# Patient Record
Sex: FEMALE | Race: BLACK OR AFRICAN AMERICAN | NOT HISPANIC OR LATINO | URBAN - METROPOLITAN AREA
[De-identification: names, ages, dates, MRNs, and addresses within clinical notes are randomized per-mention and may not be internally consistent; named-entity substitution may affect disease eponyms.]

---

## 2017-06-05 ENCOUNTER — EMERGENCY (EMERGENCY)
Facility: HOSPITAL | Age: 22
LOS: 1 days | Discharge: HOME | End: 2017-06-05
Attending: EMERGENCY MEDICINE | Admitting: EMERGENCY MEDICINE
Payer: COMMERCIAL

## 2017-06-05 VITALS
HEART RATE: 80 BPM | RESPIRATION RATE: 16 BRPM | DIASTOLIC BLOOD PRESSURE: 87 MMHG | SYSTOLIC BLOOD PRESSURE: 123 MMHG | TEMPERATURE: 98 F | OXYGEN SATURATION: 100 %

## 2017-06-05 DIAGNOSIS — R30.0 DYSURIA: ICD-10-CM

## 2017-06-05 DIAGNOSIS — N39.0 URINARY TRACT INFECTION, SITE NOT SPECIFIED: ICD-10-CM

## 2017-06-05 LAB
APPEARANCE UR: SIGNIFICANT CHANGE UP
BACTERIA # UR AUTO: PRESENT /HPF
BILIRUB UR-MCNC: NEGATIVE — SIGNIFICANT CHANGE UP
COLOR SPEC: YELLOW — SIGNIFICANT CHANGE UP
DIFF PNL FLD: (no result)
EPI CELLS # UR: SIGNIFICANT CHANGE UP /HPF
GLUCOSE UR QL: NEGATIVE — SIGNIFICANT CHANGE UP
KETONES UR-MCNC: NEGATIVE — SIGNIFICANT CHANGE UP
LEUKOCYTE ESTERASE UR-ACNC: (no result)
NITRITE UR-MCNC: NEGATIVE — SIGNIFICANT CHANGE UP
PH UR: 6.5 — SIGNIFICANT CHANGE UP (ref 5–8)
PROT UR-MCNC: NEGATIVE MG/DL — SIGNIFICANT CHANGE UP
RBC CASTS # UR COMP ASSIST: (no result) /HPF
SP GR SPEC: 1.01 — SIGNIFICANT CHANGE UP (ref 1–1.03)
UROBILINOGEN FLD QL: 0.2 E.U./DL — SIGNIFICANT CHANGE UP
WBC UR QL: (no result) /HPF

## 2017-06-05 PROCEDURE — 87186 SC STD MICRODIL/AGAR DIL: CPT

## 2017-06-05 PROCEDURE — 81001 URINALYSIS AUTO W/SCOPE: CPT

## 2017-06-05 PROCEDURE — 87086 URINE CULTURE/COLONY COUNT: CPT

## 2017-06-05 PROCEDURE — 99283 EMERGENCY DEPT VISIT LOW MDM: CPT

## 2017-06-05 RX ORDER — PHENAZOPYRIDINE HCL 100 MG
1 TABLET ORAL
Qty: 6 | Refills: 0
Start: 2017-06-05 | End: 2017-06-07

## 2017-06-05 RX ORDER — CEFPODOXIME PROXETIL 100 MG
1 TABLET ORAL
Qty: 14 | Refills: 0
Start: 2017-06-05 | End: 2017-06-12

## 2017-06-05 NOTE — ED ADULT TRIAGE NOTE - CHIEF COMPLAINT QUOTE
increased urine frequency today , associated "tingling" ; denies vaginal discharge or other symptoms

## 2017-06-05 NOTE — ED PROVIDER NOTE - OBJECTIVE STATEMENT
The pt is a 20 y/o F, who presents to ED c/o dysuria and frequency x 1 d. Denies fevers, chills, n/v, flank pain, vag d/c

## 2017-06-05 NOTE — ED PROVIDER NOTE - MEDICAL DECISION MAKING DETAILS
pt w/uncomplicated uti, no signs of pyelo, icon neg, will tx w/abx , prn pyridium, understands to hydrate and f/u w/gyn or gu, to return for any worsening symptoms

## 2017-06-07 LAB
-  AMPICILLIN/SULBACTAM: SIGNIFICANT CHANGE UP
-  AMPICILLIN: SIGNIFICANT CHANGE UP
-  CEFAZOLIN: SIGNIFICANT CHANGE UP
-  CEFTRIAXONE: SIGNIFICANT CHANGE UP
-  CIPROFLOXACIN: SIGNIFICANT CHANGE UP
-  GENTAMICIN: SIGNIFICANT CHANGE UP
-  NITROFURANTOIN: SIGNIFICANT CHANGE UP
-  PIPERACILLIN/TAZOBACTAM: SIGNIFICANT CHANGE UP
-  TOBRAMYCIN: SIGNIFICANT CHANGE UP
-  TRIMETHOPRIM/SULFAMETHOXAZOLE: SIGNIFICANT CHANGE UP
CULTURE RESULTS: SIGNIFICANT CHANGE UP
METHOD TYPE: SIGNIFICANT CHANGE UP
ORGANISM # SPEC MICROSCOPIC CNT: SIGNIFICANT CHANGE UP
ORGANISM # SPEC MICROSCOPIC CNT: SIGNIFICANT CHANGE UP
SPECIMEN SOURCE: SIGNIFICANT CHANGE UP

## 2017-06-19 ENCOUNTER — APPOINTMENT (OUTPATIENT)
Dept: OBGYN | Facility: CLINIC | Age: 22
End: 2017-06-19

## 2017-06-19 VITALS
HEIGHT: 64 IN | BODY MASS INDEX: 23.05 KG/M2 | SYSTOLIC BLOOD PRESSURE: 100 MMHG | DIASTOLIC BLOOD PRESSURE: 70 MMHG | WEIGHT: 135 LBS

## 2017-06-19 DIAGNOSIS — N91.3 PRIMARY OLIGOMENORRHEA: ICD-10-CM

## 2017-06-19 DIAGNOSIS — Z11.3 ENCOUNTER FOR SCREENING FOR INFECTIONS WITH A PREDOMINANTLY SEXUAL MODE OF TRANSMISSION: ICD-10-CM

## 2017-06-19 DIAGNOSIS — Z30.09 ENCOUNTER FOR OTHER GENERAL COUNSELING AND ADVICE ON CONTRACEPTION: ICD-10-CM

## 2017-06-19 DIAGNOSIS — Z01.419 ENCOUNTER FOR GYNECOLOGICAL EXAMINATION (GENERAL) (ROUTINE) W/OUT ABNORMAL FINDINGS: ICD-10-CM

## 2017-06-19 DIAGNOSIS — N39.0 URINARY TRACT INFECTION, SITE NOT SPECIFIED: ICD-10-CM

## 2017-06-19 DIAGNOSIS — B37.3 CANDIDIASIS OF VULVA AND VAGINA: ICD-10-CM

## 2017-06-19 DIAGNOSIS — Z87.898 PERSONAL HISTORY OF OTHER SPECIFIED CONDITIONS: ICD-10-CM

## 2017-06-19 RX ORDER — CEFPODOXIME PROXETIL 200 MG/1
200 TABLET, FILM COATED ORAL
Qty: 14 | Refills: 0 | Status: COMPLETED | COMMUNITY
Start: 2017-06-05

## 2017-06-19 RX ORDER — FLUCONAZOLE 150 MG/1
150 TABLET ORAL
Qty: 1 | Refills: 0 | Status: COMPLETED | COMMUNITY
Start: 2017-06-08

## 2017-06-19 RX ORDER — LEVONORGESTREL 1.5 MG/1
1.5 TABLET ORAL
Qty: 1 | Refills: 2 | Status: ACTIVE | COMMUNITY
Start: 2017-06-19 | End: 1900-01-01

## 2017-06-19 RX ORDER — IBUPROFEN 600 MG/1
600 TABLET, FILM COATED ORAL
Qty: 28 | Refills: 0 | Status: COMPLETED | COMMUNITY
Start: 2017-02-25

## 2017-06-19 RX ORDER — PHENAZOPYRIDINE HYDROCHLORIDE 200 MG/1
200 TABLET ORAL
Qty: 6 | Refills: 0 | Status: COMPLETED | COMMUNITY
Start: 2017-06-05

## 2017-06-19 RX ORDER — LEVONORGESTREL AND ETHINYL ESTRADIOL 100-20(84)
0.1-0.02 & 0.01 KIT ORAL DAILY
Qty: 1 | Refills: 2 | Status: ACTIVE | COMMUNITY
Start: 2017-06-19 | End: 1900-01-01

## 2017-06-20 LAB
ESTRADIOL SERPL-MCNC: 44 PG/ML
FSH SERPL-MCNC: 6.9 IU/L
HCG SERPL-MCNC: <1 MIU/ML
HIV1+2 AB SPEC QL IA.RAPID: NONREACTIVE
PROLACTIN SERPL-MCNC: 24.2 NG/ML
T PALLIDUM AB SER QL IA: NEGATIVE
TSH SERPL-ACNC: 1.61 UIU/ML

## 2017-06-21 LAB
C TRACH RRNA SPEC QL NAA+PROBE: NORMAL
N GONORRHOEA RRNA SPEC QL NAA+PROBE: NORMAL
SOURCE TP AMPLIFICATION: NORMAL

## 2017-06-26 ENCOUNTER — APPOINTMENT (OUTPATIENT)
Dept: INTERNAL MEDICINE | Facility: CLINIC | Age: 22
End: 2017-06-26

## 2017-06-26 VITALS
HEART RATE: 74 BPM | OXYGEN SATURATION: 97 % | HEIGHT: 64 IN | BODY MASS INDEX: 24.75 KG/M2 | WEIGHT: 145 LBS | SYSTOLIC BLOOD PRESSURE: 100 MMHG | TEMPERATURE: 98.6 F | DIASTOLIC BLOOD PRESSURE: 70 MMHG

## 2017-06-26 DIAGNOSIS — H53.8 OTHER VISUAL DISTURBANCES: ICD-10-CM

## 2017-06-26 DIAGNOSIS — Z00.00 ENCOUNTER FOR GENERAL ADULT MEDICAL EXAMINATION W/OUT ABNORMAL FINDINGS: ICD-10-CM

## 2017-06-26 DIAGNOSIS — R51 HEADACHE: ICD-10-CM

## 2017-06-27 LAB
CYTOLOGY CVX/VAG DOC THIN PREP: NORMAL
DHEA-SULFATE, SERUM: 179 UG/DL
TESTOST BND SERPL-MCNC: 0.8 PG/ML
TESTOST SERPL-MCNC: 32.9 NG/DL

## 2017-07-03 ENCOUNTER — APPOINTMENT (OUTPATIENT)
Dept: OBGYN | Facility: CLINIC | Age: 22
End: 2017-07-03
Payer: SELF-PAY

## 2017-07-03 VITALS
WEIGHT: 145 LBS | HEIGHT: 64 IN | DIASTOLIC BLOOD PRESSURE: 70 MMHG | SYSTOLIC BLOOD PRESSURE: 110 MMHG | BODY MASS INDEX: 24.75 KG/M2

## 2017-07-03 DIAGNOSIS — E22.9 HYPERFUNCTION OF PITUITARY GLAND, UNSPECIFIED: ICD-10-CM

## 2017-07-03 PROCEDURE — 99213 OFFICE O/P EST LOW 20 MIN: CPT

## 2017-07-06 ENCOUNTER — LABORATORY RESULT (OUTPATIENT)
Age: 22
End: 2017-07-06

## 2017-07-11 ENCOUNTER — TRANSCRIPTION ENCOUNTER (OUTPATIENT)
Age: 22
End: 2017-07-11

## 2017-07-11 ENCOUNTER — RESULT REVIEW (OUTPATIENT)
Age: 22
End: 2017-07-11

## 2018-10-09 ENCOUNTER — EMERGENCY (EMERGENCY)
Facility: HOSPITAL | Age: 23
LOS: 1 days | Discharge: HOME | End: 2018-10-09
Admitting: EMERGENCY MEDICINE
Payer: COMMERCIAL

## 2018-10-09 VITALS
HEART RATE: 79 BPM | TEMPERATURE: 98 F | SYSTOLIC BLOOD PRESSURE: 108 MMHG | RESPIRATION RATE: 18 BRPM | DIASTOLIC BLOOD PRESSURE: 70 MMHG | HEIGHT: 64 IN | OXYGEN SATURATION: 98 % | WEIGHT: 143.52 LBS

## 2018-10-09 DIAGNOSIS — H93.8X2 OTHER SPECIFIED DISORDERS OF LEFT EAR: ICD-10-CM

## 2018-10-09 DIAGNOSIS — Z79.899 OTHER LONG TERM (CURRENT) DRUG THERAPY: ICD-10-CM

## 2018-10-09 DIAGNOSIS — Z79.1 LONG TERM (CURRENT) USE OF NON-STEROIDAL ANTI-INFLAMMATORIES (NSAID): ICD-10-CM

## 2018-10-09 PROCEDURE — 99283 EMERGENCY DEPT VISIT LOW MDM: CPT

## 2018-10-09 RX ORDER — IBUPROFEN 200 MG
1 TABLET ORAL
Qty: 10 | Refills: 0
Start: 2018-10-09 | End: 2018-10-13

## 2018-10-09 RX ORDER — FLUTICASONE PROPIONATE 50 MCG
2 SPRAY, SUSPENSION NASAL
Qty: 1 | Refills: 0
Start: 2018-10-09 | End: 2018-10-11
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
## 2019-07-17 ENCOUNTER — EMERGENCY (EMERGENCY)
Facility: HOSPITAL | Age: 24
LOS: 1 days | Discharge: HOME | End: 2019-07-17
Admitting: EMERGENCY MEDICINE
Payer: COMMERCIAL

## 2019-07-17 VITALS
TEMPERATURE: 98 F | HEIGHT: 64 IN | RESPIRATION RATE: 18 BRPM | OXYGEN SATURATION: 98 % | WEIGHT: 138.01 LBS | DIASTOLIC BLOOD PRESSURE: 74 MMHG | SYSTOLIC BLOOD PRESSURE: 104 MMHG | HEART RATE: 76 BPM
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
## 2019-07-17 DIAGNOSIS — S93.602A UNSPECIFIED SPRAIN OF LEFT FOOT, INITIAL ENCOUNTER: ICD-10-CM

## 2019-07-17 DIAGNOSIS — M25.572 PAIN IN LEFT ANKLE AND JOINTS OF LEFT FOOT: ICD-10-CM

## 2019-07-17 DIAGNOSIS — S93.402A SPRAIN OF UNSPECIFIED LIGAMENT OF LEFT ANKLE, INITIAL ENCOUNTER: ICD-10-CM
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
## 2019-07-17 PROCEDURE — 99283 EMERGENCY DEPT VISIT LOW MDM: CPT

## 2019-07-17 RX ORDER — IBUPROFEN 200 MG
600 TABLET ORAL ONCE
Refills: 0 | Status: COMPLETED | OUTPATIENT
Start: 2019-07-17 | End: 2019-07-17

## 2019-07-17 RX ADMIN — Medication 600 MILLIGRAM(S): at 13:48

## 2019-07-17 RX ADMIN — Medication 600 MILLIGRAM(S): at 13:46
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
## 2019-07-17 NOTE — ED PROVIDER NOTE - CLINICAL SUMMARY MEDICAL DECISION MAKING FREE TEXT BOX
22 yo female , well appearing, in NAD, ambulatory w/o difficulties, in the Er due to left ankle/foot [pain since morning. exam- mild tenderness to palpation to the dorsal aspect of left foot and ankle, no deformity, no edema, no vascular compromise. Pt suspect sprain. possibility of sprain discussed. will d/c home with ACE bandage and NSAIDs for pain. ortho f/u if pain persist or become worse.

## 2019-07-17 NOTE — ED PROVIDER NOTE - CARE PROVIDER_API CALL
Warren Conroy)  Orthopaedic Surgery  [address] [place] [address]  Phone: [phone]  Fax: [phone]  Follow Up Time:

## 2019-07-17 NOTE — ED PROVIDER NOTE - NSFOLLOWUPINSTRUCTIONS_ED_ALL_ED_FT
I have discussed the discharge plan with the patient. The patient agrees with the plan, as discussed.  The patient understands Emergency Department diagnosis is a preliminary diagnosis often based on limited information and that the patient must adhere to the follow-up plan as discussed.  The patient understands that if the symptoms worsen or if prescribed medications do not have the desired/planned effect that the patient may return to the Emergency Department at any time for further evaluation and treatment.      Ankle Sprain  Image   An ankle sprain is a stretch or tear in a ligament in the ankle. Ligaments are tissues that connect bones to each other.    The two most common types of ankle sprains are:  Inversion sprain. This happens when the foot turns inward and the ankle rolls outward. It affects the ligament on the outside of the foot (lateral ligament).  Eversion sprain. This happens when the foot turns outward and the ankle rolls inward. It affects the ligament on the inner side of the foot (medial ligament).  What are the causes?  This condition is often caused by accidentally rolling or twisting the ankle.    What increases the risk?  This condition is more likely to develop in people who play sports.    What are the signs or symptoms?  ImageSymptoms of this condition include:  Pain in your ankle.  Swelling.  Bruising. Bruising may develop right after you sprain your ankle or 1–2 days later.  Trouble standing or walking, especially when you turn or change directions.  How is this diagnosed?  This condition is diagnosed with a physical exam. During the exam, your health care provider will press on certain parts of your foot and ankle and try to move them in certain ways. X-rays may be taken to see how severe the sprain is and to check for broken bones.    How is this treated?  This condition may be treated with:  A brace. This is used to keep the ankle from moving until it heals.  An elastic bandage. This is used to support the ankle.  Crutches.  Pain medicine.  Surgery. This may be needed if the sprain is severe.  Physical therapy. This may help to improve the range of motion in the ankle.  Follow these instructions at home:  Image   Rest your ankle.  Take over-the-counter and prescription medicines only as told by your health care provider.  For 2–3 days, keep your ankle raised (elevated) above the level of your heart as much as possible.  If directed, apply ice to the area:  Put ice in a plastic bag.  Place a towel between your skin and the bag.  Leave the ice on for 20 minutes, 2–3 times a day.  If you were given a brace:  Wear it as directed.  Remove it to shower or bathe.  Try not to move your ankle much, but wiggle your toes from time to time. This helps to prevent swelling.  If you were given an elastic bandage (dressing):  Remove it to shower or bathe.  Try not to move your ankle much, but wiggle your toes from time to time. This helps to prevent swelling.  Adjust the dressing to make it more comfortable if it feels too tight.  Loosen the dressing if you have numbness or tingling in your foot, or if your foot becomes cold and blue.  If you have crutches, use them as told by your health care provider.  Contact a health care provider if:  You have rapidly increasing bruising or swelling.  Your pain is not relieved with medicine.  Get help right away if:  Your foot or toes become numb or blue.  You have severe pain that gets worse.  Summary  An ankle sprain is a stretch or tear in a ligament in the ankle. Ligaments are tissues that connect bones to each other.  To relieve pain and swelling, place ice on the affected ankle, raise your ankle above the level of your heart, and use an elastic bandage. Also, rest as told by your health care provider.  This information is not intended to replace advice given to you by your health care provider. Make sure you discuss any questions you have with your health care provider.

## 2019-07-17 NOTE — ED PROVIDER NOTE - PHYSICAL EXAMINATION
CONST: Well appearing, well nourished, awake, alert,  in no apparent distress.  HENMT: Airway patent, Nasal mucosa clear. Mouth with normal mucosa.  EYES: clear b/l, PRRL, EOMI,   CARDIAC: Normal rate, regular rhythm.    No murmurs, rubs or gallops.  RESP: Breath sounds clear and equal bilaterally. no wheezes, no rales, no rhonchi  GI: Abdomen soft, non-tender, no guarding.  BS+ in all 4 quadrants  MSK: Left foot shows no edema, no discoloration, normal ROM, good pulses. Sensation intact. Mild tenderness to palpation over dorsal aspect. Left knee is normal.  NEURO: Alert and oriented, no focal deficits, no motor or sensory deficits.  SKIN: Skin normal color for race, warm, dry and intact. No evidence of rash.  PSYCH: Alert and oriented to person, place, time/situation. normal mood and affect. no apparent risk to self or others.
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
## 2019-07-17 NOTE — ED ADULT NURSE REASSESSMENT NOTE - NS ED NURSE REASSESS COMMENT FT1
ace wrap applied to left ankle.  cap refill brisk post procedure.  pt given instructions for use at home.

## 2019-12-12 ENCOUNTER — EMERGENCY (EMERGENCY)
Facility: HOSPITAL | Age: 24
LOS: 1 days | Discharge: HOME | End: 2019-12-12
Admitting: EMERGENCY MEDICINE
Payer: COMMERCIAL

## 2019-12-12 VITALS
TEMPERATURE: 98 F | RESPIRATION RATE: 18 BRPM | DIASTOLIC BLOOD PRESSURE: 77 MMHG | WEIGHT: 149.91 LBS | SYSTOLIC BLOOD PRESSURE: 114 MMHG | HEART RATE: 76 BPM | OXYGEN SATURATION: 100 %
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
## 2019-12-12 DIAGNOSIS — S69.92XA UNSPECIFIED INJURY OF LEFT WRIST, HAND AND FINGER(S), INITIAL ENCOUNTER: ICD-10-CM
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
## 2019-12-12 DIAGNOSIS — M79.642 PAIN IN LEFT HAND: ICD-10-CM
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
## 2019-12-12 PROCEDURE — 99283 EMERGENCY DEPT VISIT LOW MDM: CPT

## 2019-12-12 PROCEDURE — 73130 X-RAY EXAM OF HAND: CPT

## 2019-12-12 PROCEDURE — 73130 X-RAY EXAM OF HAND: CPT | Mod: 26,LT

## 2019-12-12 NOTE — ED PROVIDER NOTE - PATIENT PORTAL LINK FT
You can access the FollowMyHealth Patient Portal offered by [facility] by registering at the following website: [email or web address]. By joining [employer]’s FollowMyHealth portal, you will also be able to view your health information using other applications (apps) compatible with our system.

## 2019-12-12 NOTE — ED ADULT NURSE NOTE - CHPI ED NUR SYMPTOMS NEG
no numbness/no back pain/no weakness/no bruising/no stiffness/no difficulty bearing weight/no abrasion/no deformity/no fever
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
## 2019-12-12 NOTE — ED PROVIDER NOTE - CARE PROVIDER_API CALL
Praveen Ayala)  Plastic Surgery  [address] [place] [id number]  Phone: [phone]  Fax: [phone]  Follow Up Time:     Elsie Doyle)  Orthopaedic Surgery  [address] [place] [address], [address]  [place] [address]  Phone: [phone]  Fax: [phone]  Follow Up Time:
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
## 2019-12-12 NOTE — ED PROVIDER NOTE - MUSCULOSKELETAL, MLM
Spine and all extremities grossly  appears normal, range of motion is not limited tenderness to left hand-dorsal aspect, b/w

## 2020-04-15 ENCOUNTER — EMERGENCY (EMERGENCY)
Facility: HOSPITAL | Age: 25
LOS: 1 days | Discharge: HOME | End: 2020-04-15
Admitting: EMERGENCY MEDICINE
Payer: COMMERCIAL

## 2020-04-15 VITALS
SYSTOLIC BLOOD PRESSURE: 113 MMHG | WEIGHT: 147.93 LBS | OXYGEN SATURATION: 98 % | HEIGHT: 64 IN | HEART RATE: 83 BPM | RESPIRATION RATE: 17 BRPM | TEMPERATURE: 99 F | DIASTOLIC BLOOD PRESSURE: 81 MMHG

## 2020-04-15 LAB — SARS-COV-2 RNA SPEC QL NAA+PROBE: DETECTED

## 2020-04-15 PROCEDURE — 87635 SARS-COV-2 COVID-19 AMP PRB: CPT

## 2020-04-15 PROCEDURE — 99283 EMERGENCY DEPT VISIT LOW MDM: CPT

## 2020-04-15 PROCEDURE — 99284 EMERGENCY DEPT VISIT MOD MDM: CPT

## 2020-04-15 NOTE — ED PROVIDER NOTE - PATIENT PORTAL LINK FT
You can access the FollowMyHealth Patient Portal offered by [facility] by registering at the following website: [email or web address]. By joining [employer]’s FollowMyHealth portal, you will also be able to view your health information using other applications (apps) compatible with our system.

## 2020-04-15 NOTE — ED ADULT NURSE NOTE - OBJECTIVE STATEMENT
Pt. requesting COVID test, c/o cough w/ CP and on/off SOB, loss of smell/taste, migraines, diarrhea, x 2 weeks. Last had fever last week. Denies any sick contacts, accompanied by brother w/ similar Sx.

## 2020-04-15 NOTE — ED PROVIDER NOTE - OBJECTIVE STATEMENT
24F presents requesting COVID swab. Pt reports improving mild cough x ~2 weeks. Pt reports upon onset she had aches, subjective fevers, and cough; afebrile x 1 week. Pt reports loss of taste/smell. Denies N/V/D, SOB. (+) COVID Exposure

## 2020-04-15 NOTE — ED PROVIDER NOTE - NSFOLLOWUPINSTRUCTIONS_ED_ALL_ED_FT
EnglishSpanish    Viral Illness, Adult  Viruses are tiny germs that can get into a person's body and cause illness. There are many different types of viruses, and they cause many types of illness. Viral illnesses can range from mild to severe. They can affect various parts of the body.  Common illnesses that are caused by a virus include colds and the flu. Viral illnesses also include serious conditions such as HIV/AIDS (human immunodeficiency virus/acquired immunodeficiency syndrome). A few viruses have been linked to certain cancers.  What are the causes?  Many types of viruses can cause illness. Viruses invade cells in your body, multiply, and cause the infected cells to malfunction or die. When the cell dies, it releases more of the virus. When this happens, you develop symptoms of the illness, and the virus continues to spread to other cells. If the virus takes over the function of the cell, it can cause the cell to divide and grow out of control, as is the case when a virus causes cancer.  Different viruses get into the body in different ways. You can get a virus by:  Swallowing food or water that is contaminated with the virus.Breathing in droplets that have been coughed or sneezed into the air by an infected person.Touching a surface that has been contaminated with the virus and then touching your eyes, nose, or mouth.Being bitten by an insect or animal that carries the virus.Having sexual contact with a person who is infected with the virus.Being exposed to blood or fluids that contain the virus, either through an open cut or during a transfusion.If a virus enters your body, your body's defense system (immune system) will try to fight the virus. You may be at higher risk for a viral illness if your immune system is weak.  What are the signs or symptoms?  Symptoms vary depending on the type of virus and the location of the cells that it invades. Common symptoms of the main types of viral illnesses include:  Cold and flu viruses     Fever.Headache.Sore throat.Muscle aches.Nasal congestion.Cough.Digestive system (gastrointestinal) viruses     Fever.Abdominal pain.Nausea.Diarrhea.Liver viruses (hepatitis)     Loss of appetite.Tiredness.Yellowing of the skin (jaundice).Brain and spinal cord viruses     Fever.Headache.Stiff neck.Nausea and vomiting.Confusion or sleepiness.Skin viruses     Warts.Itching.Rash.Sexually transmitted viruses     Discharge.Swelling.Redness.Rash.How is this treated?  Viruses can be difficult to treat because they live within cells. Antibiotic medicines do not treat viruses because these drugs do not get inside cells. Treatment for a viral illness may include:  Resting and drinking plenty of fluids.Medicines to relieve symptoms. These can include over-the-counter medicine for pain and fever, medicines for cough or congestion, and medicines to relieve diarrhea.Antiviral medicines. These drugs are available only for certain types of viruses. They may help reduce flu symptoms if taken early. There are also many antiviral medicines for hepatitis and HIV/AIDS.Some viral illnesses can be prevented with vaccinations. A common example is the flu shot.  Follow these instructions at home:  Medicines        Take over-the-counter and prescription medicines only as told by your health care provider.If you were prescribed an antiviral medicine, take it as told by your health care provider. Do not stop taking the medicine even if you start to feel better.Be aware of when antibiotics are needed and when they are not needed. Antibiotics do not treat viruses. If your health care provider thinks that you may have a bacterial infection as well as a viral infection, you may get an antibiotic.  Do not ask for an antibiotic prescription if you have been diagnosed with a viral illness. That will not make your illness go away faster.Frequently taking antibiotics when they are not needed can lead to antibiotic resistance. When this develops, the medicine no longer works against the bacteria that it normally fights.General instructions     Drink enough fluids to keep your urine clear or pale yellow.Rest as much as possible.Return to your normal activities as told by your health care provider. Ask your health care provider what activities are safe for you.Keep all follow-up visits as told by your health care provider. This is important.How is this prevented?  Take these actions to reduce your risk of viral infection:  Eat a healthy diet and get enough rest.Wash your hands often with soap and water. This is especially important when you are in public places. If soap and water are not available, use hand .Avoid close contact with friends and family who have a viral illness.If you travel to areas where viral gastrointestinal infection is common, avoid drinking water or eating raw food.Keep your immunizations up to date. Get a flu shot every year as told by your health care provider.Do not share toothbrushes, nail clippers, razors, or needles with other people.Always practice safe sex.Contact a health care provider if:  You have symptoms of a viral illness that do not go away.Your symptoms come back after going away.Your symptoms get worse.Get help right away if:  You have trouble breathing.You have a severe headache or a stiff neck.You have severe vomiting or abdominal pain.This information is not intended to replace advice given to you by your health care provider. Make sure you discuss any questions you have with your health care provider.    Document Released: 04/28/2017 Document Revised: 05/31/2017 Document Reviewed: 04/28/2017  [employer] Patient Education © 2020 [employer]

## 2020-04-15 NOTE — ED ADULT TRIAGE NOTE - CHIEF COMPLAINT QUOTE
Patient states, "I had symptoms of the virus two weeks ago, now I still have a cough and I can't smell."  Patient denies any fevers.

## 2020-04-19 DIAGNOSIS — Z20.828 CONTACT WITH AND (SUSPECTED) EXPOSURE TO OTHER VIRAL COMMUNICABLE DISEASES: ICD-10-CM

## 2020-04-19 DIAGNOSIS — R05 COUGH: ICD-10-CM

## 2020-04-19 DIAGNOSIS — B34.9 VIRAL INFECTION, UNSPECIFIED: ICD-10-CM

## 2020-04-19 DIAGNOSIS — R43.8 OTHER DISTURBANCES OF SMELL AND TASTE: ICD-10-CM

## 2021-04-19 ENCOUNTER — EMERGENCY (EMERGENCY)
Facility: HOSPITAL | Age: 26
LOS: 1 days | Discharge: HOME | End: 2021-04-19
Admitting: EMERGENCY MEDICINE
Payer: COMMERCIAL

## 2021-04-19 VITALS
HEIGHT: 64 IN | SYSTOLIC BLOOD PRESSURE: 107 MMHG | HEART RATE: 89 BPM | RESPIRATION RATE: 18 BRPM | OXYGEN SATURATION: 98 % | WEIGHT: 169.98 LBS | TEMPERATURE: 99 F | DIASTOLIC BLOOD PRESSURE: 75 MMHG

## 2021-04-19 DIAGNOSIS — M54.5 LOW BACK PAIN: ICD-10-CM

## 2021-04-19 LAB
APPEARANCE UR: CLEAR — SIGNIFICANT CHANGE UP
BACTERIA # UR AUTO: SIGNIFICANT CHANGE UP /HPF
BILIRUB UR-MCNC: NEGATIVE — SIGNIFICANT CHANGE UP
COLOR SPEC: YELLOW — SIGNIFICANT CHANGE UP
DIFF PNL FLD: ABNORMAL
EPI CELLS # UR: ABNORMAL /HPF (ref 0–5)
GLUCOSE UR QL: NEGATIVE — SIGNIFICANT CHANGE UP
KETONES UR-MCNC: NEGATIVE — SIGNIFICANT CHANGE UP
LEUKOCYTE ESTERASE UR-ACNC: NEGATIVE — SIGNIFICANT CHANGE UP
NITRITE UR-MCNC: NEGATIVE — SIGNIFICANT CHANGE UP
PH UR: 7 — SIGNIFICANT CHANGE UP (ref 5–8)
PROT UR-MCNC: NEGATIVE MG/DL — SIGNIFICANT CHANGE UP
RBC CASTS # UR COMP ASSIST: < 5 /HPF — SIGNIFICANT CHANGE UP
SP GR SPEC: 1.01 — SIGNIFICANT CHANGE UP (ref 1–1.03)
UROBILINOGEN FLD QL: 0.2 E.U./DL — SIGNIFICANT CHANGE UP
WBC UR QL: < 5 /HPF — SIGNIFICANT CHANGE UP

## 2021-04-19 PROCEDURE — 71045 X-RAY EXAM CHEST 1 VIEW: CPT | Mod: 26

## 2021-04-19 PROCEDURE — 71045 X-RAY EXAM CHEST 1 VIEW: CPT

## 2021-04-19 PROCEDURE — 81001 URINALYSIS AUTO W/SCOPE: CPT

## 2021-04-19 PROCEDURE — 99284 EMERGENCY DEPT VISIT MOD MDM: CPT | Mod: 25

## 2021-04-19 PROCEDURE — 99283 EMERGENCY DEPT VISIT LOW MDM: CPT | Mod: 25

## 2021-04-19 PROCEDURE — 87086 URINE CULTURE/COLONY COUNT: CPT

## 2021-04-19 RX ORDER — IBUPROFEN 200 MG
1 TABLET ORAL
Qty: 30 | Refills: 0
Start: 2021-04-19 | End: 2021-04-28

## 2021-04-19 RX ORDER — IBUPROFEN 200 MG
600 TABLET ORAL ONCE
Refills: 0 | Status: COMPLETED | OUTPATIENT
Start: 2021-04-19 | End: 2021-04-19

## 2021-04-19 RX ADMIN — Medication 600 MILLIGRAM(S): at 12:26

## 2021-04-19 RX ADMIN — Medication 600 MILLIGRAM(S): at 13:10

## 2021-04-19 NOTE — ED PROVIDER NOTE - PATIENT PORTAL LINK FT
You can access the FollowMyHealth Patient Portal offered by [facility] by registering at the following website: [email or web address]. By joining [employer]’s FollowMyHealth portal, you will also be able to view your health information using other applications (apps) compatible with our system.

## 2021-04-19 NOTE — ED PROVIDER NOTE - CLINICAL SUMMARY MEDICAL DECISION MAKING FREE TEXT BOX
low back pain. neuro exam intact. pt well appearing. pain meds given. chest x-ray done given pain to upper left lumbar region. no acute pathology. ua no infection. + blood consistent with menses. do  not suspect stone given no cva tenderness. suspect muscular pain. motrin, warm compresses and f/u with pmd. return precautions d/w pt

## 2021-04-19 NOTE — ED PROVIDER NOTE - NSFOLLOWUPINSTRUCTIONS_ED_ALL_ED_FT
Follow up with your primary care physician this week.                    Acute Low Back Pain    WHAT YOU NEED TO KNOW:    Acute low back pain is sudden discomfort that lasts up to 6 weeks and makes activity difficult.    DISCHARGE INSTRUCTIONS:    Return to the emergency department if:   •You have severe pain.      •You have sudden stiffness and heaviness on both buttocks down to both legs.      •You have numbness or weakness in one leg, or pain in both legs.      •You have numbness in your genital area or across your lower back.      •You cannot control your urine or bowel movements.      Call your doctor if:   •You have a fever.      •You have pain at night or when you rest.      •Your pain does not get better with treatment.      •You have pain that worsens when you cough or sneeze.      •You suddenly feel something pop or snap in your back.      •You have questions or concerns about your condition or care.      Medicines: You may need any of the following:  •NSAIDs, such as ibuprofen, help decrease swelling, pain, and fever. This medicine is available with or without a doctor's order. NSAIDs can cause stomach bleeding or kidney problems in certain people. If you take blood thinner medicine, always ask your healthcare provider if NSAIDs are safe for you. Always read the medicine label and follow directions.      •Acetaminophen decreases pain and fever. It is available without a doctor's order. Ask how much to take and how often to take it. Follow directions. Read the labels of all other medicines you are using to see if they also contain acetaminophen, or ask your doctor or pharmacist. Acetaminophen can cause liver damage if not taken correctly. Do not use more than 4 grams (4,000 milligrams) total of acetaminophen in one day.       •Muscle relaxers decrease pain by relaxing the muscles in your lower spine.      •Prescription pain medicine may be given. Ask your healthcare provider how to take this medicine safely. Some prescription pain medicines contain acetaminophen. Do not take other medicines that contain acetaminophen without talking to your healthcare provider. Too much acetaminophen may cause liver damage. Prescription pain medicine may cause constipation. Ask your healthcare provider how to prevent or treat constipation.       Self-care:   •Stay active as much as you can without causing more pain. Bed rest could make your back pain worse. Start with some light exercises, such as walking. Avoid heavy lifting until your pain is gone. Ask for more information about the activities or exercises that are right for you.   Family Walking for Exercise           •Apply heat on your back for 20 to 30 minutes every 2 hours for as many days as directed. Heat helps decrease pain and muscle spasms. Alternate heat and ice.      •Apply ice on your back for 15 to 20 minutes every hour or as directed. Use an ice pack, or put crushed ice in a plastic bag. Cover it with a towel before you apply it to your skin. Ice helps prevent tissue damage and decreases swelling and pain.      Prevent acute low back pain:   •Use proper body mechanics. ?Bend at the hips and knees when you  objects. Do not bend from the waist. Use your leg muscles as you lift the load. Do not use your back. Keep the object close to your chest as you lift it. Try not to twist or lift anything above your waist.      ?Change your position often when you stand for long periods of time. Rest one foot on a small box or footrest, and then switch to the other foot often.      ?Try not to sit for long periods of time. When you do, sit in a straight-backed chair with your feet flat on the floor. Never reach, pull, or push while you are sitting.      •Do exercises that strengthen your back muscles. Warm up before you exercise. Ask your healthcare provider the best exercises for you.      •Maintain a healthy weight. Ask your healthcare provider what a healthy weight is for you. Ask him or her to help you create a weight loss plan if you are overweight.      Follow up with your doctor as directed: Return for a follow-up visit if you still have pain after 1 to 3 weeks of treatment. You may need to visit an orthopedist if your back pain lasts longer than 12 weeks. Write down your questions so you remember to ask them during your visits.       © Copyright [employer] 2021           back to top                          © Copyright [employer] 2021

## 2021-04-19 NOTE — ED ADULT TRIAGE NOTE - CHIEF COMPLAINT QUOTE
Pt presents reporting lower back pain for 1 wk, states she has felt a knot in her words to her lower back for 3 months. Pt denies numbness or tingling to the extremities, denies incontinence of bowel or bladder.

## 2021-04-19 NOTE — ED PROVIDER NOTE - MUSCULOSKELETAL, MLM
Spine appears normal, range of motion is not limited, mild tenderness to left lower back. no deformity. no midline tenderness. strength 5/5 b/l LE. distal sensation intact.

## 2021-04-19 NOTE — ED ADULT NURSE NOTE - OBJECTIVE STATEMENT
26 yo F presents to ED co lower back pain x3 months worsening over the past week. No trauma or heavy lifting. Pt describes as feeling, " a knot going towards my lower back." Pt A&Ox4, ambulatory with steady gait, speaking in clear/full sentences, no acute distress, vital signs stable. Sensation intact. Denies loss of control of bowels or bladder.

## 2021-04-19 NOTE — ED PROVIDER NOTE - OBJECTIVE STATEMENT
24 y/o female with no sig PMHx c/o left low back pain x 2 months. pt states stiffness to left lower back region. pain worse with movement. no numbness, tingling or weakness. no trauma. no cp or sob. no abd pain, n/v. no incontinence. no radiation of pain. pt taking tylenol with minimal relief. no further complaints.

## 2021-04-19 NOTE — ED ADULT NURSE NOTE - CCCP TRG CHIEF CMPLNT
Problem List Items Addressed This Visit        Behavioral    Depression with anxiety     Using sertraline 100 yr round.   Continue.          Relevant Medications    sertraline (ZOLOFT) 100 MG tablet       Respiratory    Elevated hemidiaphragm     abnl spirometry so I do think her symptoms are coming from the paralysis  Symptoms are improving  Seeing pulm in a month  Recheck CXR before that appt  May resolve on it's own or may be accommodating to the abnormality          Relevant Orders    XR CHEST PA AND LATERAL 2 VIEWS    HENDERSON (dyspnea on exertion) - Primary     Improved  W/u found R hemidiaphragm paralysis w/ abnl PFT's  There was also mild to mod AI on echo  I suspect symptoms related to the hemidiaphragm paralysis  We discussed pathophysiology  Seeing pulm on 1/4/21  Recheck CXR right before that appt  If pulm does not feel this is the cause of symptoms, consider seeing cardiology.           Relevant Orders    XR CHEST PA AND LATERAL 2 VIEWS       Circulatory    Nonrheumatic aortic valve insufficiency     Mild to mod AI w/ G1DD  Recheck echo in May to June  Consider cardiol eval mich if HENDERSON continues.                 back pain general

## 2021-04-20 LAB
CULTURE RESULTS: NO GROWTH — SIGNIFICANT CHANGE UP
SPECIMEN SOURCE: SIGNIFICANT CHANGE UP

## 2021-10-13 ENCOUNTER — EMERGENCY (EMERGENCY)
Facility: HOSPITAL | Age: 26
LOS: 1 days | Discharge: HOME | End: 2021-10-13
Attending: EMERGENCY MEDICINE | Admitting: EMERGENCY MEDICINE
Payer: COMMERCIAL

## 2021-10-13 VITALS
TEMPERATURE: 98 F | HEIGHT: 64 IN | SYSTOLIC BLOOD PRESSURE: 137 MMHG | RESPIRATION RATE: 16 BRPM | HEART RATE: 93 BPM | OXYGEN SATURATION: 100 % | WEIGHT: 179.9 LBS | DIASTOLIC BLOOD PRESSURE: 89 MMHG

## 2021-10-13 DIAGNOSIS — R20.2 PARESTHESIA OF SKIN: ICD-10-CM

## 2021-10-13 DIAGNOSIS — R20.0 ANESTHESIA OF SKIN: ICD-10-CM

## 2021-10-13 PROCEDURE — 99283 EMERGENCY DEPT VISIT LOW MDM: CPT

## 2021-10-13 PROCEDURE — 99282 EMERGENCY DEPT VISIT SF MDM: CPT

## 2021-10-13 PROCEDURE — 82962 GLUCOSE BLOOD TEST: CPT

## 2021-10-13 NOTE — ED ADULT NURSE NOTE - OBJECTIVE STATEMENT
Presents for c/o intermittent B hand numbness x 2 weeks since receiving Covid vaccine. Denies cp/sob/neuro symptoms/ trauma.

## 2021-10-13 NOTE — ED PROVIDER NOTE - PHYSICAL EXAMINATION
PERRL, EOMI, no nystagmus. CN intact. Strength 5/5. Steady unassisted gait. No pronator drift. Sensation intact. Normal speech, no dysarthria. No carotid bruits. Negative Romberg, normal tandem gait. sensation intact  normal finger adduction/abduction/opposition, normal cap refill, no overlying skin changes.

## 2021-10-13 NOTE — ED ADULT TRIAGE NOTE - CHIEF COMPLAINT QUOTE
Patient arrives ambulatory reporting intermittent eduardo hand numbness since receiving the 1st dose of Pfizer COVID vaccine mid sept2021.

## 2021-10-13 NOTE — ED PROVIDER NOTE - OBJECTIVE STATEMENT
25F no PMH p/w numbness. Received COVID vaccine ~4w ago. States that next day she developed numbness to b/l hands, front and back, all fingers as well. Since then it has been intermittent. Lasts <20min, ~2 episodes per day. Symptoms not worsening, however not improving either so came to ED. Currently asymptomatic. also concerned it may possibly be new diabetes.   Works as .   Denies other weakness/numbness, HA, vision changes, back/neck pain, incontinence, f/c, SOB/CP, rashes, joint pains, urinary complaints. Denies hx of prior similar symptoms.

## 2021-10-13 NOTE — ED PROVIDER NOTE - CLINICAL SUMMARY MEDICAL DECISION MAKING FREE TEXT BOX
25F no PMH p/w numbness. Received COVID vaccine ~4w ago. States that next day she developed numbness to b/l hands, front and back, all fingers as well. Since then it has been intermittent. Lasts <20min, ~2 episodes per day. Symptoms not worsening, however not improving either so came to ED. Currently asymptomatic. also concerned it may possibly be new diabetes. No other systemic symptoms. Vitals wnl, exam as above.  ddx: Unclear etiology. Clinically not CVA.   will check FSG.   Discussed importance of outpt follow up and return precautions. Clinically no indication for further emergent ED workup or hospitalization at this time. Comfortable for dc, outpt f/u.

## 2021-10-13 NOTE — ED ADULT NURSE NOTE - TEMPLATE
Patient: [place]    Procedure Summary     Date:  06/12/19 Room / Location:  [facility] OR 01 / [facility] MAIN OR    Anesthesia Start:  0943 Anesthesia Stop:  1024    Procedure:  CYSTOSCOPY TUR BLADDER TUMOR (N/A ) Diagnosis:      Surgeon:  Joel Russell MD Provider:  Robbi Hanks MD    Anesthesia Type:  general ASA Status:  2          Anesthesia Type: general  Last vitals  BP   112/75 (06/12/19 1045)   Temp   36.6 °C (97.8 °F) (06/12/19 1045)   Pulse   62 (06/12/19 1045)   Resp   16 (06/12/19 1045)     SpO2   96 % (06/12/19 1045)     Post Anesthesia Care and Evaluation    Patient location during evaluation: PACU  Patient participation: complete - patient participated  Level of consciousness: awake and alert  Pain management: adequate  Airway patency: patent  Anesthetic complications: No anesthetic complications    Cardiovascular status: acceptable  Respiratory status: acceptable  Hydration status: acceptable    Comments: ---------------------------               06/12/19                      1045         ---------------------------   BP:          112/75         Pulse:         62           Resp:          16           Temp:   36.6 °C (97.8 °F)   SpO2:          96%         ---------------------------       General

## 2021-10-13 NOTE — ED PROVIDER NOTE - PATIENT PORTAL LINK FT
You can access the FollowMyHealth Patient Portal offered by [facility] by registering at the following website: [email or web address]. By joining [facility]’s FollowMyHealth portal, you will also be able to view your health information using other applications (apps) compatible with our system.

## 2021-10-13 NOTE — ED PROVIDER NOTE - NSFOLLOWUPINSTRUCTIONS_ED_ALL_ED_FT
It is unclear what exactly is causing your symptoms! It is important to continue following up with your doctor outside the hospital and to return to ER for: Persistent fever/vomiting, uncontrolled pain, worsening swelling, worsening breathing, worsening lightheaded, spreading redness, focal weakness.    Stay well hydrated.    Follow up with primary doctor within 1-2 days.     Follow up with neurology within 1 week. Can call [phone] to schedule appointment.    Can take multivitamin (one daily) if you are not eating a balanced diet.     Paresthesia    Paresthesia is an abnormal burning or prickling sensation. It is usually felt in the hands, arms, legs, or feet. However, it may occur in any part of the body. Usually, paresthesia is not painful. It may feel like:  Tingling or numbness.  Pins and needles.  Skin crawling.  Buzzing.  Arms or legs falling asleep.  Itching.  Paresthesia may occur without any clear cause, or it may be caused by:  Breathing too quickly (hyperventilation).  Pressure on a nerve.  An underlying medical condition.  Side effects of a medication.  Nutritional deficiencies.  Exposure to toxic chemicals.  Most people experience temporary (transient) paresthesia at some time in their lives. For some people, it may be long-lasting (chronic) because of an underlying medical condition. If you have paresthesia that lasts a long time, you may need to be evaluated by your health care provider.    Follow these instructions at home:    Alcohol use   Do not drink alcohol if:  Your health care provider tells you not to drink.  You are pregnant, may be pregnant, or are planning to become pregnant.  If you drink alcohol, limit how much you have:  0–1 drink a day for women.  0–2 drinks a day for men.  Be aware of how much alcohol is in your drink. In the [place] one drink equals one typical bottle of beer (12 oz), one-half glass of wine (5 oz), or one shot of hard liquor (1½ oz).  Nutrition     Eat a healthy diet. This includes:  Eating foods that are high in fiber, such as fresh fruits and vegetables, whole grains, and beans.  Limiting foods that are high in fat and processed sugars, such as fried or sweet foods.    General instructions     Take over-the-counter and prescription medicines only as told by your health care provider.  Do not use any products that contain nicotine or tobacco, such as cigarettes and e-cigarettes. These can keep blood from reaching damaged nerves. If you need help quitting, ask your health care provider.  If you have diabetes, work closely with your health care provider to keep your blood sugar under control.  If you have numbness in your feet:  Check every day for signs of injury or infection. Watch for redness, warmth, and swelling.  Wear padded socks and comfortable shoes. These help protect your feet.  Keep all follow-up visits as told by your health care provider. This is important.    Contact a health care provider if you:  Have paresthesia that gets worse or does not go away.  Have a burning or prickling feeling that gets worse when you walk.  Have pain, cramps, or dizziness.  Develop a rash.  Get help right away if you:  Feel weak.  Have trouble walking or moving.  Have problems with speech, understanding, or vision.  Feel confused.  Cannot control your bladder or bowel movements.  Have numbness after an injury.  Develop new weakness in an arm or leg.  Faint.

## 2022-10-07 NOTE — ED ADULT NURSE NOTE - PAIN: PRESENCE, MLM
"Encounter Date: 10/6/2022       History     Chief Complaint   Patient presents with    Headache     Pt reports hx of headache that related to dermoid cysts on her scalp. Pt states "I woke up with a headache, constant with a intermittent shooting pain on my right side. I don't know if it is because of the cyst". Pt c/o "I never stay with the headache".   Pt reports she had similar episode of HA and dizziness r/t the cysts of the left side. The symptoms were resolved after Dr. Anderson removed the cysts.     Pt denies visual or neurological changes. Gait steady. A cyst is noted at r head.  Last took A     Pt here with right frontal HA all day today. Onset gradual and now 8/10. She reports mild nausea and dizziness. No weakness or numbness. No hx of HA like this.     The history is provided by the patient.   Headache   This is a new problem. The current episode started today. The problem occurs constantly. The problem has been unchanged. The pain is located in the Frontal region. The pain does not radiate. The quality of the pain is described as aching and throbbing. The pain is at a severity of 8/10. Associated symptoms include blurred vision, dizziness, nausea, phonophobia and photophobia. Pertinent negatives include no abdominal pain, abnormal behavior, anorexia, back pain, coughing, drainage, ear pain, eye pain, eye redness, eye watering, facial sweating, fever, hearing loss, insomnia, loss of balance, muscle aches, neck pain, numbness, rhinorrhea, scalp tenderness, seizures, sinus pressure, sore throat, swollen glands, tingling, tinnitus, visual change, vomiting, weakness or weight loss. The symptoms are aggravated by bright light, noise and activity. She has tried nothing for the symptoms. Her past medical history is significant for migraines in the family and obesity. There is no history of migraine headaches or recent head traumas.   Review of patient's allergies indicates:  No Known Allergies  Past Medical " History:   Diagnosis Date    ADHD (attention deficit hyperactivity disorder)     Depression     Dermoid cyst of scalp     Dizzy     Enlarged heart     Heart murmur of      Obesity      Past Surgical History:   Procedure Laterality Date    Excision of cyst of scalp  2018     Family History   Problem Relation Age of Onset    Heart disease Mother     Heart disease Maternal Aunt     Heart disease Maternal Uncle     Heart disease Maternal Grandmother     Diabetes Maternal Grandmother     Heart disease Maternal Grandfather     Diabetes Paternal Grandfather      Social History     Tobacco Use    Smoking status: Never    Smokeless tobacco: Never   Substance Use Topics    Alcohol use: No    Drug use: No     Review of Systems   Constitutional:  Negative for fever and weight loss.   HENT:  Negative for ear pain, hearing loss, rhinorrhea, sinus pressure, sore throat and tinnitus.    Eyes:  Positive for blurred vision and photophobia. Negative for pain, redness and visual disturbance.   Respiratory:  Negative for cough.    Gastrointestinal:  Positive for nausea. Negative for abdominal pain, anorexia and vomiting.   Musculoskeletal:  Negative for back pain and neck pain.   Neurological:  Positive for dizziness, light-headedness and headaches. Negative for tingling, seizures, weakness, numbness and loss of balance.   Psychiatric/Behavioral:  The patient does not have insomnia.    All other systems reviewed and are negative.    Physical Exam     Initial Vitals [10/06/22 2315]   BP Pulse Resp Temp SpO2   (!) 137/96 70 20 99 °F (37.2 °C) 100 %      MAP       --         Physical Exam    Nursing note and vitals reviewed.  Constitutional:   Obese WF in NAD. Uncomfortable   HENT:   Head: Normocephalic and atraumatic.   Mouth/Throat: Oropharynx is clear and moist.   Eyes: Conjunctivae and EOM are normal. Pupils are equal, round, and reactive to light.   Neck: Neck supple.   Normal range of motion.  Cardiovascular:  Normal  rate, regular rhythm, normal heart sounds and intact distal pulses.           Pulmonary/Chest: Breath sounds normal.   Abdominal: Abdomen is soft. There is no abdominal tenderness.   Musculoskeletal:         General: No tenderness or edema. Normal range of motion.      Cervical back: Normal range of motion and neck supple.     Neurological: She is alert and oriented to person, place, and time. She has normal strength. No cranial nerve deficit or sensory deficit. GCS score is 15. GCS eye subscore is 4. GCS verbal subscore is 5. GCS motor subscore is 6.   Skin: Skin is warm and dry. Capillary refill takes less than 2 seconds. No rash noted. No erythema. No pallor.       ED Course   Procedures  Labs Reviewed - No data to display       Imaging Results              CT Head Without Contrast (In process)  Result time 10/07/22 00:43:15                     Medications   ketorolac injection 60 mg (60 mg Intramuscular Given 10/6/22 2344)   HYDROcodone-acetaminophen  mg per tablet 1 tablet (1 tablet Oral Given 10/6/22 2344)   ondansetron disintegrating tablet 4 mg (4 mg Oral Given 10/6/22 2344)   morphine injection 8 mg (8 mg Intramuscular Given 10/7/22 0030)     Medical Decision Making:   Differential Diagnosis:   Migraine, ICH, tension HA, trigeminal neuralgia  Clinical Tests:   Radiological Study: Ordered and Reviewed  ED Management:  Pt presented with right side HA c/w migraine. She was neuro intact. No improvement with toradol and norco. Improved after morphine IM. CT head performed and neg. Rx fioricet. PCP f/u.            ED Course as of 10/07/22 0157   Fri Oct 07, 2022   0041 Somewhat intractable HA, new onset. Will get CT. [DC]      ED Course User Index  [DC] Petey Mcfarlane Jr., MD                 Clinical Impression:   Final diagnoses:  [G43.909] Migraine without status migrainosus, not intractable, unspecified migraine type (Primary)      ED Disposition Condition    Discharge Stable          ED Prescriptions        Medication Sig Dispense Start Date End Date Auth. Provider    butalbital-acetaminophen-caffeine -40 mg (FIORICET, ESGIC) -40 mg per tablet Take 2 tablets by mouth every 6 (six) hours as needed for Headaches. 20 tablet 10/7/2022 11/6/2022 Petey Mcfarlane Jr., MD          Follow-up Information       Follow up With Specialties Details Why Contact Info    Leilani Buenrostro MD Family Medicine On 10/10/2022  [address] [facility] [place] [address] [phone]               Petey Mcfarlane Jr., MD  10/07/22 0157     denies pain/discomfort

## 2023-01-26 NOTE — ED ADULT TRIAGE NOTE - BP NONINVASIVE DIASTOLIC (MM HG)
What Type Of Note Output Would You Prefer (Optional)?: Standard Output Is The Patient Presenting As Previously Scheduled?: Yes How Severe Is Your Rash?: mild Is This A New Presentation, Or A Follow-Up?: Rash Additional History: Pt has tried TAC, Lotrisone, and Benadryl. This bothers her at night and disturbs her sleep. Pt states she had something similar to this prior to her spinal surgery in 08/2022, suspected to be poison sumac. She changed to TIDE Laundry Soap with Oxyclean from regular Tide about 1.5 months ago now she changed back which didn’t help. Pt has never been patch tested. Wears perfume, uses [employer]molive as wash, uses olay moisturizer. 70

## 2023-04-17 NOTE — ED PROVIDER NOTE - EYES NEGATIVE STATEMENT, MLM
44 yo female, PMH DVT/PEs, previously on Eliquis, which pt decided to self-discontinue 6 months ago and has only been taking aspirin; hx/o adenomyosis, presented to the ED c/o nocturnal cough x 4 weeks and 2 days of increasing dyspnea aggravated with activity/movement.  In the ED, VSS, pt afebrile, O2 sats @ 100% on room air.  WBC 6.99, Hb 12.4, platelets 423.  CMP: unremarkable.  Trop <6.  BHCG <5.  ProBNP 31.  RVP: 229E Coronavirus detected; COVID NotDetected; RVP otherwise negative.  CT angio chest PE protocol was performed: "....IMPRESSION: No acute pulmonary embolus of central, main, or lobar pulmonary arterial tree. Several linearly oriented pulmonary arterial filling defects, for example along the right sided pulmonary arterial tree, images 207-218 series 304, are likely reflective of interval evolution of chronic PE.  Segmental and subsegmental pulmonary arterial tree cannot be assessed on this study due to artifact.  No pneumonia. Given the reported respiratory symptoms, suggest echocardiogram to evaluate for potential pulmonary hypertension.  Cholelithiasis....".  Pt was restarted on Eliquis; 10 milligram dose given in the ED.  Pt was dispo'd to CDU for continued care plan:  Tele / O2 sat monitoring, Eliquis BID, echo, Tele Doc of Day evaluation, general observation care / monitoring.  In the interim, pt objectively noted to be resting comfortably; pt has been clinically stable.
Pt is 44 yo female, PMH DVT/PEs, previously on Eliquis, which pt decided to self-discontinue 6 months ago; hx/o adenomyosis who presented to the ED c/o cough x 4 weeks and 2 days of increasing dyspnea on exertion.  In the ED, VSS, pt afebrile, O2 sats @ 100% on room air.  WBC 6.99, Hb 12.4, platelets 423.  CMP: unremarkable.  Trop <6.  BHCG <5.  ProBNP 31.  RVP: 229E Coronavirus detected; COVID NotDetected; RVP otherwise negative.  CT angio chest PE protocol was performed: "....IMPRESSION: No acute pulmonary embolus of central, main, or lobar pulmonary arterial tree. Several linearly oriented pulmonary arterial filling defects, for example along the right sided pulmonary arterial tree, images 207-218 series 304, are likely reflective of interval evolution of chronic PE.  Segmental and subsegmental pulmonary arterial tree cannot be assessed on this study due to artifact.  No pneumonia. Given the reported respiratory symptoms, suggest echocardiogram to evaluate for potential pulmonary hypertension.  Cholelithiasis....".  Pt was restarted on Eliquis; 10 milligram dose given in the ED.  Pt was dispo'd to CDU for continued care plan:  Tele / O2 sat monitoring, Eliquis BID, echo, Tele Doc of Day evaluation, general observation care / monitoring.  In the interim, pt evaluated by cardiology and recs appreciated. Pt pending Echo in AM. No acute events overnight.
no discharge, no irritation, no pain, no redness, and no visual changes.

## 2024-01-17 NOTE — ED ADULT NURSE NOTE - FINAL NURSING ELECTRONIC SIGNATURE
Labs reviewed. Has upcoming appt. Will discuss at visit.       Thank you,    Dr. Pineda
Pt presented for lab work, labs were drawn on the right arm.      
05-Jun-2017 15:39

## 2025-01-22 NOTE — ED ADULT NURSE NOTE - RESPIRATORY ASSESSMENT
Patient called regarding nuswab results. Results are still pending. Per patient she is having more pain. Would like something called in to treat trichomonas.    WDL